# Patient Record
Sex: MALE | Race: OTHER | Employment: OTHER | URBAN - METROPOLITAN AREA
[De-identification: names, ages, dates, MRNs, and addresses within clinical notes are randomized per-mention and may not be internally consistent; named-entity substitution may affect disease eponyms.]

---

## 2017-01-10 NOTE — PATIENT DISCUSSION
MILD DRY EYES: PRESCRIBED ARTIFICIAL TEARS BID - QID, OU RETURN FOR FOLLOW-UP AS SCHEDULED OR SOONER IF SYMPTOMS WORSEN.

## 2017-08-24 NOTE — PATIENT DISCUSSION
- Suspect that her lids tend to open overnight given pattern of PEEs inferiorly. Start Refresh ointment qHS OU.

## 2017-08-24 NOTE — PATIENT DISCUSSION
- Continue Travatan qHS OU for now.  Consider changing to latanoprost when she runs out of Travatan due to cost

## 2017-08-24 NOTE — PATIENT DISCUSSION
New Prescription: doxycycline monohydrate (doxycycline monohydrate): capsule: 50 mg 1 capsule twice a day as directed by mouth 08-

## 2017-10-30 NOTE — PATIENT DISCUSSION
- Consider starting Doxycycline again but patient is having a Crohn's disease flare and having a colonoscopy later this week. Will hold for now.

## 2017-10-30 NOTE — PATIENT DISCUSSION
- Possible autoimmune contribution.  Patient has Crohn's disease and is currently having an exacerbation

## 2017-10-30 NOTE — PATIENT DISCUSSION
New Prescription: Restasis (cyclosporine): dropperette: 0.05% 1 drop twice a day as directed into both eyes 10-

## 2017-10-30 NOTE — PATIENT DISCUSSION
- Above goal IOP. Discussed additional treatment options including adding an IOP lowering drop vs. SLT.

## 2018-02-01 NOTE — PATIENT DISCUSSION
Continue: Restasis (cyclosporine): dropperette: 0.05% 1 drop twice a day as directed into both eyes 10-

## 2018-02-01 NOTE — PATIENT DISCUSSION
- Schedule YAG capsulotomy OD next week.  Discussed r/b/a of the procedure, patient appears to understand and wishes to proceed

## 2019-10-29 NOTE — PATIENT DISCUSSION
- Follow up in 1 year for [facility] [facility], [facility]

## 2020-07-14 NOTE — PATIENT DISCUSSION
New Prescription: travoprost (travoprost): drops: 0.004% 1 drop at bedtime as directed into both eyes 07-

## 2020-07-14 NOTE — PATIENT DISCUSSION
Stopped Today: Travatan Z (travoprost): drops: 0.004% 1 drop at bedtime as directed into both eyes 10-

## 2021-11-15 ENCOUNTER — CATARACT CONSULT (OUTPATIENT)
Dept: URBAN - METROPOLITAN AREA CLINIC 39 | Facility: CLINIC | Age: 66
End: 2021-11-15

## 2021-11-15 DIAGNOSIS — Z97.3: ICD-10-CM

## 2021-11-15 DIAGNOSIS — H43.813: ICD-10-CM

## 2021-11-15 DIAGNOSIS — H25.811: ICD-10-CM

## 2021-11-15 DIAGNOSIS — H04.123: ICD-10-CM

## 2021-11-15 DIAGNOSIS — H18.513: ICD-10-CM

## 2021-11-15 DIAGNOSIS — H25.812: ICD-10-CM

## 2021-11-15 DIAGNOSIS — H35.371: ICD-10-CM

## 2021-11-15 PROCEDURE — 92025AV CORNEAL TOPOGRAPHY, AV

## 2021-11-15 PROCEDURE — 92136TC INTERFEROMETRY - TECHNICAL COMPONENT

## 2021-11-15 PROCEDURE — 92015 DETERMINE REFRACTIVE STATE: CPT

## 2021-11-15 PROCEDURE — 92014 COMPRE OPH EXAM EST PT 1/>: CPT

## 2021-11-15 PROCEDURE — 92134 CPTRZ OPH DX IMG PST SGM RTA: CPT

## 2021-11-15 PROCEDURE — V2799PMN IMPRIMIS PRED-MOXI-NEPAF 5ML

## 2021-11-15 PROCEDURE — 92286 ANT SGM IMG I&R SPECLR MIC: CPT

## 2021-11-15 ASSESSMENT — VISUAL ACUITY
OD_SC: CF 6FT
OD_SC: J1 @ 8"
OD_RAM: 20/20
OD_CC: 20/30
OS_AM: 20/25
OS_SC: J1 @ 8"
OS_CC: J1
OS_SC: CF 6FT
OD_CC: J1
OS_BAT: 20/50
OS_CC: 20/40-1
OD_BAT: 20/50

## 2021-11-15 ASSESSMENT — TONOMETRY
OS_IOP_MMHG: 22
OD_IOP_MMHG: 22

## 2021-11-19 NOTE — PROCEDURE NOTE: CLINICAL
PROCEDURE NOTE: SLT #1 OS. Diagnosis: POAG, Moderate. Anesthesia: Topical. Prep: Betadine Flush. Prior to treatment, the risks/benefits/alternatives were discussed. The patient wished to proceed with procedure. Lens:  SLT laser lens with goniosol. Power: 85 mJ. Total applications: 201. Application 823 Degrees. Patient tolerated procedure well. There were no complications. Post-op instructions given. Post-op IOP = 18 mmHg. Georges Figureoa

## 2021-11-22 ENCOUNTER — DIAGNOSTICS ONLY (OUTPATIENT)
Dept: URBAN - METROPOLITAN AREA CLINIC 39 | Facility: CLINIC | Age: 66
End: 2021-11-22

## 2021-11-22 DIAGNOSIS — H25.812: ICD-10-CM

## 2021-11-22 DIAGNOSIS — H43.813: ICD-10-CM

## 2021-11-22 DIAGNOSIS — H04.123: ICD-10-CM

## 2021-11-22 DIAGNOSIS — H35.371: ICD-10-CM

## 2021-11-22 DIAGNOSIS — Z97.3: ICD-10-CM

## 2021-11-22 DIAGNOSIS — H25.811: ICD-10-CM

## 2021-11-22 DIAGNOSIS — H18.513: ICD-10-CM

## 2021-11-22 PROCEDURE — 92025 CPTRIZED CORNEAL TOPOGRAPHY: CPT

## 2021-11-22 PROCEDURE — 99211T TECH SERVICE

## 2021-11-22 PROCEDURE — 92136TC INTERFEROMETRY - TECHNICAL COMPONENT

## 2021-11-30 ENCOUNTER — SURGERY/PROCEDURE (OUTPATIENT)
Dept: URBAN - METROPOLITAN AREA CLINIC 39 | Facility: CLINIC | Age: 66
End: 2021-11-30

## 2021-11-30 ENCOUNTER — PRE-OP/H&P (OUTPATIENT)
Dept: URBAN - METROPOLITAN AREA CLINIC 39 | Facility: CLINIC | Age: 66
End: 2021-11-30

## 2021-11-30 DIAGNOSIS — H25.812: ICD-10-CM

## 2021-11-30 PROCEDURE — 66999LNSR LENSAR LASER FOR CAT SX

## 2021-11-30 PROCEDURE — 6698454AV REMOVE CATARACT, INSERT ADVANCED LENS (SX ONLY)

## 2021-11-30 PROCEDURE — 99211T TECH SERVICE

## 2021-11-30 PROCEDURE — 65772LRI LRI DURING CAT SX

## 2021-12-01 ENCOUNTER — PRE-OP/H&P (OUTPATIENT)
Dept: URBAN - METROPOLITAN AREA CLINIC 39 | Facility: CLINIC | Age: 66
End: 2021-12-01

## 2021-12-01 ENCOUNTER — POST-OP CATARACT (OUTPATIENT)
Dept: URBAN - METROPOLITAN AREA CLINIC 39 | Facility: CLINIC | Age: 66
End: 2021-12-01

## 2021-12-01 ENCOUNTER — SURGERY/PROCEDURE (OUTPATIENT)
Dept: URBAN - METROPOLITAN AREA CLINIC 39 | Facility: CLINIC | Age: 66
End: 2021-12-01

## 2021-12-01 DIAGNOSIS — Z96.1: ICD-10-CM

## 2021-12-01 DIAGNOSIS — H35.371: ICD-10-CM

## 2021-12-01 DIAGNOSIS — H18.513: ICD-10-CM

## 2021-12-01 DIAGNOSIS — H25.811: ICD-10-CM

## 2021-12-01 DIAGNOSIS — H43.813: ICD-10-CM

## 2021-12-01 DIAGNOSIS — Z97.3: ICD-10-CM

## 2021-12-01 DIAGNOSIS — H04.123: ICD-10-CM

## 2021-12-01 PROCEDURE — 6698454AV REMOVE CATARACT, INSERT ADVANCED LENS (SX ONLY)

## 2021-12-01 PROCEDURE — 99211T TECH SERVICE

## 2021-12-01 PROCEDURE — 66999LNSR LENSAR LASER FOR CAT SX

## 2021-12-01 PROCEDURE — 65772LRI LRI DURING CAT SX

## 2021-12-01 ASSESSMENT — VISUAL ACUITY
OD_SC: 20/50
OS_SC: 20/25
OS_SC: 20/25
OD_SC: J1 @ 5"
OS_SC: J3 @ 16"
OD_SC: CF 4FT
OS: J8

## 2021-12-01 ASSESSMENT — TONOMETRY
OS_IOP_MMHG: 21
OS_IOP_MMHG: 21
OD_IOP_MMHG: 16
OD_IOP_MMHG: 16

## 2022-04-15 ENCOUNTER — SURGERY/PROCEDURE (OUTPATIENT)
Dept: URBAN - METROPOLITAN AREA SURGERY 14 | Facility: SURGERY | Age: 67
End: 2022-04-15

## 2022-04-15 ENCOUNTER — CONSULTATION/EVALUATION (OUTPATIENT)
Dept: URBAN - METROPOLITAN AREA CLINIC 39 | Facility: CLINIC | Age: 67
End: 2022-04-15

## 2022-04-15 DIAGNOSIS — H26.493: ICD-10-CM

## 2022-04-15 DIAGNOSIS — H35.371: ICD-10-CM

## 2022-04-15 PROCEDURE — 92134 CPTRZ OPH DX IMG PST SGM RTA: CPT

## 2022-04-15 PROCEDURE — 92012 INTRM OPH EXAM EST PATIENT: CPT

## 2022-04-15 ASSESSMENT — TONOMETRY
OD_IOP_MMHG: 16
OS_IOP_MMHG: 16

## 2022-04-15 ASSESSMENT — VISUAL ACUITY
OD_SC: J1
OD_SC: 20/50
OS_SC: 20/30+1
OD_BAT: 20/200
OS_BAT: 20/400
OD: J3

## 2022-10-31 ENCOUNTER — ESTABLISHED PATIENT (OUTPATIENT)
Dept: URBAN - METROPOLITAN AREA CLINIC 39 | Facility: CLINIC | Age: 67
End: 2022-10-31

## 2022-10-31 DIAGNOSIS — H52.7: ICD-10-CM

## 2022-10-31 PROCEDURE — 99499LS LASIK SCREENING

## 2022-10-31 PROCEDURE — V2799PM PRED MOXI

## 2022-10-31 ASSESSMENT — VISUAL ACUITY
OS_SC: 20/25
OS_SC: J1
OD_SC: J1
OD_SC: 20/30

## 2022-10-31 ASSESSMENT — TONOMETRY
OD_IOP_MMHG: 18
OS_IOP_MMHG: 18

## 2022-11-09 ENCOUNTER — SURGERY/PROCEDURE (OUTPATIENT)
Dept: URBAN - METROPOLITAN AREA CLINIC 39 | Facility: CLINIC | Age: 67
End: 2022-11-09

## 2022-11-09 DIAGNOSIS — H52.7: ICD-10-CM

## 2022-11-09 PROCEDURE — 66999ISPP INTRALASE LASIK S/P ADVANCED / CUSTOM VISION < 12 MONTHS

## 2022-11-10 ENCOUNTER — POST-OP (OUTPATIENT)
Dept: URBAN - METROPOLITAN AREA CLINIC 39 | Facility: CLINIC | Age: 67
End: 2022-11-10

## 2022-11-10 DIAGNOSIS — Z98.890: ICD-10-CM

## 2022-11-10 PROCEDURE — 6699955 NON-COMANAGED LASIK PO

## 2022-11-10 ASSESSMENT — VISUAL ACUITY
OS_SC: J1
OS_SC: 20/25

## 2024-10-11 NOTE — PATIENT DISCUSSION
- Trial of Doxycycline 50 mg BID OU x 14 days Detail Level: Detailed Depth Of Biopsy: dermis Was A Bandage Applied: Yes Size Of Lesion In Cm: 0.9 X Size Of Lesion In Cm: 0 Biopsy Type: H and E Biopsy Method: double edge Personna blade Anesthesia Type: 1% lidocaine with epinephrine Anesthesia Volume In Cc: 0.5 Hemostasis: Aluminum Chloride Wound Care: Petrolatum Dressing: Band-Aid Destruction After The Procedure: No Type Of Destruction Used: Curettage Curettage Text: The wound bed was treated with curettage after the biopsy was performed. Cryotherapy Text: The wound bed was treated with cryotherapy after the biopsy was performed. Electrodesiccation Text: The wound bed was treated with electrodesiccation after the biopsy was performed. Electrodesiccation And Curettage Text: The wound bed was treated with electrodesiccation and curettage after the biopsy was performed. Silver Nitrate Text: The wound bed was treated with silver nitrate after the biopsy was performed. Lab: [id number] Lab Facility: [id number] Consent: Written consent was obtained and risks were reviewed including but not limited to scarring, infection, bleeding, scabbing, incomplete removal, nerve damage and allergy to anesthesia. With shared decision making, the patient agreed to proceed. Post-Care Instructions: The patient was instructed on proper post-care to the biopsy site (s). Leave the bandage in place with the site clean and dry for approximately 24 hours. Then, remove the bandage, clean with mild soap and water, and dry the site, apply fresh petrolatum (Vaseline or Aquaphor), and a new bandage daily until the site is fully healed. Notification Instructions: Patient will be notified of biopsy results. However, patient instructed to call the office if not contacted within 2 weeks. Billing Type: Third-Party Bill Information: Selecting Yes will display possible errors in your note based on the variables you have selected. This validation is only offered as a suggestion for you. PLEASE NOTE THAT THE VALIDATION TEXT WILL BE REMOVED WHEN YOU FINALIZE YOUR NOTE. IF YOU WANT TO FAX A PRELIMINARY NOTE YOU WILL NEED TO TOGGLE THIS TO 'NO' IF YOU DO NOT WANT IT IN YOUR FAXED NOTE. Size Of Lesion In Cm: 1.1

## 2025-01-07 NOTE — PATIENT DISCUSSION
Diagnosis:Primary open-angle glaucoma, both eyes, moderate stage [Alert] : alert [Well Nourished] : well nourished [Healthy Appearance] : healthy appearance [No Acute Distress] : no acute distress [Well Developed] : well developed [Normal Pupil & Iris Size/Symmetry] : normal pupil and iris size and symmetry [No Discharge] : no discharge [No Photophobia] : no photophobia [Sclera Not Icteric] : sclera not icteric [Normal TMs] : both tympanic membranes were normal [Normal Nasal Mucosa] : the nasal mucosa was normal [Normal Lips/Tongue] : the lips and tongue were normal [Normal Outer Ear/Nose] : the ears and nose were normal in appearance [Normal Tonsils] : normal tonsils [No Thrush] : no thrush [Pale mucosa] : pale mucosa [Supple] : the neck was supple [Normal Rate and Effort] : normal respiratory rhythm and effort [No Crackles] : no crackles [No Retractions] : no retractions [Bilateral Audible Breath Sounds] : bilateral audible breath sounds [Normal Rate] : heart rate was normal  [Normal S1, S2] : normal S1 and S2 [No murmur] : no murmur [Regular Rhythm] : with a regular rhythm [Soft] : abdomen soft [Not Tender] : non-tender [Not Distended] : not distended [No HSM] : no hepato-splenomegaly [Normal Cervical Lymph Nodes] : cervical [Skin Intact] : skin intact  [No Rash] : no rash [No Skin Lesions] : no skin lesions [No clubbing] : no clubbing [No Edema] : no edema [No Cyanosis] : no cyanosis [Normal Mood] : mood was normal [Normal Affect] : affect was normal [Alert, Awake, Oriented as Age-Appropriate] : alert, awake, oriented as age appropriate [de-identified] : clear lungs

## 2025-03-20 NOTE — PATIENT DISCUSSION
Problem: Chronic Conditions and Co-morbidities  Goal: Patient's chronic conditions and co-morbidity symptoms are monitored and maintained or improved  Outcome: Progressing  Flowsheets (Taken 3/19/2025 2000)  Care Plan - Patient's Chronic Conditions and Co-Morbidity Symptoms are Monitored and Maintained or Improved:   Monitor and assess patient's chronic conditions and comorbid symptoms for stability, deterioration, or improvement   Collaborate with multidisciplinary team to address chronic and comorbid conditions and prevent exacerbation or deterioration   Update acute care plan with appropriate goals if chronic or comorbid symptoms are exacerbated and prevent overall improvement and discharge     Problem: Discharge Planning  Goal: Discharge to home or other facility with appropriate resources  Outcome: Progressing  Flowsheets (Taken 3/19/2025 2000)  Discharge to home or other facility with appropriate resources:   Identify barriers to discharge with patient and caregiver   Arrange for needed discharge resources and transportation as appropriate   Identify discharge learning needs (meds, wound care, etc)   Refer to discharge planning if patient needs post-hospital services based on physician order or complex needs related to functional status, cognitive ability or social support system     Problem: Skin/Tissue Integrity  Goal: Skin integrity remains intact  Description: 1.  Monitor for areas of redness and/or skin breakdown  2.  Assess vascular access sites hourly  3.  Every 4-6 hours minimum:  Change oxygen saturation probe site  4.  Every 4-6 hours:  If on nasal continuous positive airway pressure, respiratory therapy assess nares and determine need for appliance change or resting period  Outcome: Progressing  Flowsheets (Taken 3/19/2025 2000)  Skin Integrity Remains Intact:   Monitor for areas of redness and/or skin breakdown   Assess vascular access sites hourly     Problem: ABCDS Injury Assessment  Goal:  - probable family history of glaucoma (grandfather)